# Patient Record
Sex: FEMALE | RURAL
[De-identification: names, ages, dates, MRNs, and addresses within clinical notes are randomized per-mention and may not be internally consistent; named-entity substitution may affect disease eponyms.]

---

## 2024-01-30 ENCOUNTER — ATHLETIC TRAINING SESSION (OUTPATIENT)
Dept: SPORTS MEDICINE | Facility: CLINIC | Age: 24
End: 2024-01-30

## 2024-01-30 NOTE — PROGRESS NOTES
Subjective:       Chief Complaint: Adina Wick is a 23 y.o. female student at  who had no chief complaint listed for this encounter.    After running in a track meet the athlete complains of pain in her left hip that goes down to her knee.          ROS              Objective:       General: Adina is well-developed, well-nourished, appears stated age, in no acute distress, alert and oriented to time, place and person.     AT Session          Assessment:     Status:     Date Seen:  01/22/2024    Date of Injury:  01/21/2024    Date Out:     Date Cleared:       Plan:       1. Heat and stretch IT Band  2. Physician Referral: no  3. ED Referral:no  4. Parent/Guardian Notified: No  5. All questions were answered, ath. will contact me for questions or concerns in  the interim.  6.         Eligible to use School Insurance: Yes